# Patient Record
Sex: MALE | Race: ASIAN | NOT HISPANIC OR LATINO | URBAN - METROPOLITAN AREA
[De-identification: names, ages, dates, MRNs, and addresses within clinical notes are randomized per-mention and may not be internally consistent; named-entity substitution may affect disease eponyms.]

---

## 2021-10-04 ENCOUNTER — OFFICE VISIT (OUTPATIENT)
Dept: URBAN - METROPOLITAN AREA CLINIC 33 | Facility: CLINIC | Age: 86
End: 2021-10-04

## 2021-10-04 VITALS
HEIGHT: 64 IN | OXYGEN SATURATION: 98 % | SYSTOLIC BLOOD PRESSURE: 138 MMHG | BODY MASS INDEX: 28 KG/M2 | WEIGHT: 164 LBS | HEART RATE: 73 BPM | DIASTOLIC BLOOD PRESSURE: 80 MMHG

## 2021-10-04 RX ORDER — LIRAGLUTIDE 6 MG/ML
AS DIRECTED INJECTION SUBCUTANEOUS
Status: ACTIVE | COMMUNITY

## 2021-10-04 RX ORDER — REPAGLINIDE 0.5 MG/1
1 TABLET 15 TO 30 MINUTES BEFORE MEALS TABLET ORAL TID
Status: ACTIVE | COMMUNITY

## 2021-10-04 RX ORDER — LEVOTHYROXINE SODIUM 0.05 MG/1
1 TABLET IN THE MORNING ON AN EMPTY STOMACH TABLET ORAL ONCE A DAY
Qty: 30 | Status: ACTIVE | COMMUNITY

## 2021-10-04 RX ORDER — SODIUM, POTASSIUM,MAG SULFATES 17.5-3.13G
ML AS DIRECTED SOLUTION, RECONSTITUTED, ORAL ORAL
Qty: 1 KIT | Refills: 0 | OUTPATIENT
Start: 2021-10-04

## 2021-10-04 RX ORDER — RIVAROXABAN 15 MG/1
1 TABLET WITH FOOD TABLET, FILM COATED ORAL ONCE A DAY
Qty: 30 | Status: ACTIVE | COMMUNITY

## 2021-10-04 RX ORDER — METOPROLOL TARTRATE 25 MG/1
1/2 TABLET TABLET, FILM COATED ORAL TWICE A DAY
Status: ACTIVE | COMMUNITY

## 2021-10-04 RX ORDER — ATORVASTATIN CALCIUM 20 MG/1
1 TABLET TABLET, FILM COATED ORAL ONCE A DAY
Qty: 30 | Status: ACTIVE | COMMUNITY

## 2021-10-04 RX ORDER — ICOSAPENT ETHYL 500 MG/1
4 CAPSULES WITH MEALS CAPSULE ORAL TWICE A DAY
Qty: 240 | Status: ACTIVE | COMMUNITY

## 2021-10-04 NOTE — HPI-MIGRATED HPI
;   ;     Bloating/Gas : 87 y/o male patient admits recent onset of bloating and gas. Patient admits associated symptoms such indigestion and changes in bowel habits.   Patient currently admits 1-2 formed bowel movements per day or every other day. He denies any associated symptoms of mucus, melena, pruritus ani, rectal pain, abdominal cramping/pain, or heartburn.  Patient is having bloating throughout the day, postprandial no matter of the content. There is no improvement with defecation. Patient denies taking any medications for relief of symptoms.   Patient admits last EGD was done over 10 years ago performed by Dr. Samanta Nix, with findings of gastritis.  Patient denies family hx of colonic disease/cancer/polyps or esophageal disease/cancer.  ;   Positive Cologuard : 87 y/o male patient presents today for a consultation about his recent positive Cologuard test. Patient denies visible bleeding in stools. Patient currently admits 1-2 formed bowel movements per day or every other day. He denies any associated symptoms of mucus, melena, pruritus ani, rectal pain, abdominal cramping/pain, or heartburn.  Patient admits last Colonoscopy was done over 10 years ago performed by Dr. Samanta Nix, resulting in polypectomy.    Patient denies a family hx of colon, gastric, or esophageal cancer/polyps.   ;

## 2021-10-11 ENCOUNTER — TELEPHONE ENCOUNTER (OUTPATIENT)
Dept: URBAN - METROPOLITAN AREA CLINIC 35 | Facility: CLINIC | Age: 86
End: 2021-10-11

## 2021-10-19 ENCOUNTER — OFFICE VISIT (OUTPATIENT)
Dept: URBAN - METROPOLITAN AREA SURGERY CENTER 8 | Facility: SURGERY CENTER | Age: 86
End: 2021-10-19

## 2021-11-01 ENCOUNTER — OFFICE VISIT (OUTPATIENT)
Dept: URBAN - METROPOLITAN AREA CLINIC 33 | Facility: CLINIC | Age: 86
End: 2021-11-01

## 2021-11-01 VITALS
WEIGHT: 166 LBS | HEIGHT: 64 IN | BODY MASS INDEX: 28.34 KG/M2 | SYSTOLIC BLOOD PRESSURE: 135 MMHG | DIASTOLIC BLOOD PRESSURE: 78 MMHG | OXYGEN SATURATION: 98 % | HEART RATE: 56 BPM

## 2021-11-01 PROBLEM — 235595009 GASTROESOPHAGEAL REFLUX DISEASE: Status: ACTIVE | Noted: 2021-10-04

## 2021-11-01 RX ORDER — LEVOTHYROXINE SODIUM 0.05 MG/1
1 TABLET IN THE MORNING ON AN EMPTY STOMACH TABLET ORAL ONCE A DAY
Qty: 30 | Status: ACTIVE | COMMUNITY

## 2021-11-01 RX ORDER — REPAGLINIDE 0.5 MG/1
1 TABLET 15 TO 30 MINUTES BEFORE MEALS TABLET ORAL TID
Status: ACTIVE | COMMUNITY

## 2021-11-01 RX ORDER — RIVAROXABAN 15 MG/1
1 TABLET WITH FOOD TABLET, FILM COATED ORAL ONCE A DAY
Qty: 30 | Status: ACTIVE | COMMUNITY

## 2021-11-01 RX ORDER — LIRAGLUTIDE 6 MG/ML
AS DIRECTED INJECTION SUBCUTANEOUS
Status: ACTIVE | COMMUNITY

## 2021-11-01 RX ORDER — ICOSAPENT ETHYL 500 MG/1
4 CAPSULES WITH MEALS CAPSULE ORAL TWICE A DAY
Qty: 240 | Status: ACTIVE | COMMUNITY

## 2021-11-01 RX ORDER — METOPROLOL TARTRATE 25 MG/1
1/2 TABLET TABLET, FILM COATED ORAL TWICE A DAY
Status: ACTIVE | COMMUNITY

## 2021-11-01 RX ORDER — SODIUM, POTASSIUM,MAG SULFATES 17.5-3.13G
ML AS DIRECTED SOLUTION, RECONSTITUTED, ORAL ORAL
Qty: 1 KIT | Refills: 0 | Status: ON HOLD | COMMUNITY
Start: 2021-10-04

## 2021-11-01 RX ORDER — ATORVASTATIN CALCIUM 20 MG/1
1 TABLET TABLET, FILM COATED ORAL ONCE A DAY
Qty: 30 | Status: ACTIVE | COMMUNITY

## 2021-11-01 NOTE — HPI-MIGRATED HPI
;   ;   ;     Bloating/Gas : Patient currently admits intermittent bloating and gas.   Patient also notes that he contiues to experience indigestion.  Patient currently admits 1-2 formed bowel movements per day or every other day. He denies any associated symptoms of mucus, melena, pruritus ani, rectal pain, abdominal cramping/pain, or heartburn.       (Last Visit 10/04/2021) 87 y/o male patient admits recent onset of bloating and gas. Patient admits associated symptoms such indigestion and changes in bowel habits.   Patient currently admits 1-2 formed bowel movements per day or every other day. He denies any associated symptoms of mucus, melena, pruritus ani, rectal pain, abdominal cramping/pain, or heartburn.  Patient is having bloating throughout the day, postprandial no matter of the content. There is no improvement with defecation. Patient denies taking any medications for relief of symptoms.   Patient admits last EGD was done over 10 years ago performed by Dr. Samanta Nix, with findings of gastritis.  Patient denies family hx of colonic disease/cancer/polyps or esophageal disease/cancer.;   Colonoscopy Follow-Up : 87 y/o male patient presents today for follow up to his colonoscopy which was completed on (10/19/2021) by Dr. Atilio Ponce.  Patient denies any complications after his procedure. Patient currently admits 1 formed bowel movements per day.  Patient denies any melena, blood or mucus in stools. Patient denies any associated abdominal pain, heartburn, bloating, or gas.   Colonoscopy report shows: Tubular adenoma ;   Positive Cologuard : Patient denies any complications after his procedure. Patient currently admits 1 formed bowel movements per day.  Patient denies any melena, blood or mucus in stools. Patient denies any associated abdominal pain, heartburn, bloating, or gas.     (Last Visit 10/04/2021) 87 y/o male patient presents today for a consultation about his recent positive Cologuard test. Patient denies visible bleeding in stools. Patient currently admits 1-2 formed bowel movements per day or every other day. He denies any associated symptoms of mucus, melena, pruritus ani, rectal pain, abdominal cramping/pain, or heartburn.  Patient admits last Colonoscopy was done over 10 years ago performed by Dr. Samanta Nix, resulting in polypectomy.    Patient denies a family hx of colon, gastric, or esophageal cancer/polyps.;

## 2023-01-23 ENCOUNTER — TELEPHONE ENCOUNTER (OUTPATIENT)
Dept: URBAN - METROPOLITAN AREA CLINIC 82 | Facility: CLINIC | Age: 88
End: 2023-01-23

## 2023-01-23 ENCOUNTER — LAB OUTSIDE AN ENCOUNTER (OUTPATIENT)
Dept: URBAN - METROPOLITAN AREA CLINIC 111 | Facility: CLINIC | Age: 88
End: 2023-01-23

## 2023-01-23 ENCOUNTER — OFFICE VISIT (OUTPATIENT)
Dept: URBAN - METROPOLITAN AREA CLINIC 111 | Facility: CLINIC | Age: 88
End: 2023-01-23
Payer: COMMERCIAL

## 2023-01-23 VITALS
HEART RATE: 80 BPM | DIASTOLIC BLOOD PRESSURE: 71 MMHG | SYSTOLIC BLOOD PRESSURE: 128 MMHG | BODY MASS INDEX: 27.52 KG/M2 | HEIGHT: 64 IN | WEIGHT: 161.2 LBS

## 2023-01-23 DIAGNOSIS — R10.13 EPIGASTRIC ABDOMINAL PAIN: ICD-10-CM

## 2023-01-23 PROCEDURE — 99213 OFFICE O/P EST LOW 20 MIN: CPT | Performed by: INTERNAL MEDICINE

## 2023-01-23 RX ORDER — SODIUM, POTASSIUM,MAG SULFATES 17.5-3.13G
ML AS DIRECTED SOLUTION, RECONSTITUTED, ORAL ORAL
Qty: 1 KIT | Refills: 0 | Status: ON HOLD | COMMUNITY
Start: 2021-10-04

## 2023-01-23 RX ORDER — LEVOTHYROXINE SODIUM 0.05 MG/1
1 TABLET IN THE MORNING ON AN EMPTY STOMACH TABLET ORAL ONCE A DAY
Qty: 30 | Status: ACTIVE | COMMUNITY

## 2023-01-23 RX ORDER — ATORVASTATIN CALCIUM 20 MG/1
1 TABLET TABLET, FILM COATED ORAL ONCE A DAY
Qty: 30 | Status: ACTIVE | COMMUNITY

## 2023-01-23 RX ORDER — METOPROLOL TARTRATE 25 MG/1
1/2 TABLET TABLET, FILM COATED ORAL TWICE A DAY
Status: ACTIVE | COMMUNITY

## 2023-01-23 RX ORDER — ATORVASTATIN CALCIUM 40 MG/1
1 TABLET TABLET, FILM COATED ORAL ONCE A DAY
Status: ACTIVE | COMMUNITY

## 2023-01-23 RX ORDER — ICOSAPENT ETHYL 500 MG/1
4 CAPSULES WITH MEALS CAPSULE ORAL TWICE A DAY
Qty: 240 | Status: ACTIVE | COMMUNITY

## 2023-01-23 RX ORDER — LIRAGLUTIDE 6 MG/ML
AS DIRECTED INJECTION SUBCUTANEOUS
Status: ACTIVE | COMMUNITY

## 2023-01-23 RX ORDER — RIVAROXABAN 15 MG/1
1 TABLET WITH FOOD TABLET, FILM COATED ORAL ONCE A DAY
Qty: 30 | Status: ACTIVE | COMMUNITY

## 2023-01-23 RX ORDER — REPAGLINIDE 0.5 MG/1
1 TABLET 15 TO 30 MINUTES BEFORE MEALS TABLET ORAL TID
Status: ACTIVE | COMMUNITY

## 2023-01-23 NOTE — HPI-EPIGASTRIC PAIN
Patient presents with abdominal pain.  Patient states pain is the epigastric area.  Patient states pain is dull ache.  Patient denies radiation of the pain.  Patient states association with eating.  No specific triggers.  Patient denies NSAIDs.  Patient denies nausea or vomiting.  Patient denies any fever or chills.  Patient denies any diarrhea or constipation.  Patient denies any melena or rectal bleeding.

## 2023-02-02 ENCOUNTER — TELEPHONE ENCOUNTER (OUTPATIENT)
Dept: URBAN - METROPOLITAN AREA CLINIC 12 | Facility: CLINIC | Age: 88
End: 2023-02-02

## 2023-03-01 ENCOUNTER — OFFICE VISIT (OUTPATIENT)
Dept: URBAN - METROPOLITAN AREA CLINIC 23 | Facility: CLINIC | Age: 88
End: 2023-03-01

## 2023-03-14 ENCOUNTER — OFFICE VISIT (OUTPATIENT)
Dept: URBAN - METROPOLITAN AREA MEDICAL CENTER 27 | Facility: MEDICAL CENTER | Age: 88
End: 2023-03-14

## 2023-03-14 ENCOUNTER — CLAIMS CREATED FROM THE CLAIM WINDOW (OUTPATIENT)
Dept: URBAN - METROPOLITAN AREA MEDICAL CENTER 27 | Facility: MEDICAL CENTER | Age: 88
End: 2023-03-14
Payer: COMMERCIAL

## 2023-03-14 DIAGNOSIS — K22.2 ACQUIRED ESOPHAGEAL RING: ICD-10-CM

## 2023-03-14 DIAGNOSIS — K22.89 DILATATION OF ESOPHAGUS: ICD-10-CM

## 2023-03-14 DIAGNOSIS — K31.89 ACQUIRED DEFORMITY OF DUODENUM: ICD-10-CM

## 2023-03-14 DIAGNOSIS — R13.19 CERVICAL DYSPHAGIA: ICD-10-CM

## 2023-03-14 PROCEDURE — 43248 EGD GUIDE WIRE INSERTION: CPT | Performed by: INTERNAL MEDICINE

## 2023-03-14 PROCEDURE — 43239 EGD BIOPSY SINGLE/MULTIPLE: CPT | Performed by: INTERNAL MEDICINE

## 2023-04-19 ENCOUNTER — OFFICE VISIT (OUTPATIENT)
Dept: URBAN - METROPOLITAN AREA CLINIC 23 | Facility: CLINIC | Age: 88
End: 2023-04-19
Payer: COMMERCIAL

## 2023-04-19 VITALS
SYSTOLIC BLOOD PRESSURE: 134 MMHG | DIASTOLIC BLOOD PRESSURE: 69 MMHG | HEIGHT: 64 IN | TEMPERATURE: 97.1 F | HEART RATE: 74 BPM | BODY MASS INDEX: 20.96 KG/M2 | WEIGHT: 122.8 LBS

## 2023-04-19 DIAGNOSIS — K22.70 BARRETT'S ESOPHAGUS WITHOUT DYSPLASIA: ICD-10-CM

## 2023-04-19 PROBLEM — 302914006: Status: ACTIVE | Noted: 2023-04-19

## 2023-04-19 PROCEDURE — 99213 OFFICE O/P EST LOW 20 MIN: CPT | Performed by: INTERNAL MEDICINE

## 2023-04-19 RX ORDER — ATORVASTATIN CALCIUM 20 MG/1
1 TABLET TABLET, FILM COATED ORAL ONCE A DAY
Qty: 30 | Status: ACTIVE | COMMUNITY

## 2023-04-19 RX ORDER — REPAGLINIDE 0.5 MG/1
1 TABLET 15 TO 30 MINUTES BEFORE MEALS TABLET ORAL TID
Status: ACTIVE | COMMUNITY

## 2023-04-19 RX ORDER — ASPIRIN 81 MG/1
1 TABLET TABLET, CHEWABLE ORAL ONCE A DAY
Status: ACTIVE | COMMUNITY

## 2023-04-19 RX ORDER — LEVOTHYROXINE SODIUM 0.05 MG/1
1 TABLET IN THE MORNING ON AN EMPTY STOMACH TABLET ORAL ONCE A DAY
Qty: 30 | Status: ACTIVE | COMMUNITY

## 2023-04-19 RX ORDER — CIMETIDINE 400 MG/1
1 TABLET AT BEDTIME TABLET, FILM COATED ORAL ONCE A DAY
Qty: 30 | Refills: 11 | OUTPATIENT
Start: 2023-04-19

## 2023-04-19 RX ORDER — METOPROLOL TARTRATE 25 MG/1
1/2 TABLET TABLET, FILM COATED ORAL TWICE A DAY
Status: ACTIVE | COMMUNITY

## 2023-04-19 RX ORDER — RIVAROXABAN 15 MG/1
1 TABLET WITH FOOD TABLET, FILM COATED ORAL ONCE A DAY
Qty: 30 | Status: ACTIVE | COMMUNITY

## 2023-04-19 RX ORDER — LIRAGLUTIDE 6 MG/ML
AS DIRECTED INJECTION SUBCUTANEOUS
Status: ON HOLD | COMMUNITY

## 2023-04-19 RX ORDER — ICOSAPENT ETHYL 500 MG/1
4 CAPSULES WITH MEALS CAPSULE ORAL TWICE A DAY
Qty: 240 | Status: ACTIVE | COMMUNITY

## 2023-04-19 RX ORDER — SODIUM, POTASSIUM,MAG SULFATES 17.5-3.13G
ML AS DIRECTED SOLUTION, RECONSTITUTED, ORAL ORAL
Qty: 1 KIT | Refills: 0 | Status: ON HOLD | COMMUNITY
Start: 2021-10-04

## 2023-04-19 RX ORDER — ATORVASTATIN CALCIUM 40 MG/1
1 TABLET TABLET, FILM COATED ORAL ONCE A DAY
Status: ACTIVE | COMMUNITY

## 2023-04-19 NOTE — HPI-ENDOSCOPY (EGD) FOLLOWUP
Patient is here for follow-up after EGD.  Patient had biopsies.  Patient denies any complications.  Patient denies any abdominal pain.  Patient denies any nausea or vomiting.  Patient denies any diarrhea or constipation.  Patient denies any melena or rectal bleeding.

## 2024-03-07 ENCOUNTER — OV NP (OUTPATIENT)
Dept: URBAN - METROPOLITAN AREA CLINIC 23 | Facility: CLINIC | Age: 89
End: 2024-03-07

## 2024-03-07 ENCOUNTER — OV EP (OUTPATIENT)
Dept: URBAN - METROPOLITAN AREA CLINIC 23 | Facility: CLINIC | Age: 89
End: 2024-03-07
Payer: COMMERCIAL

## 2024-03-07 ENCOUNTER — LAB (OUTPATIENT)
Dept: URBAN - METROPOLITAN AREA CLINIC 23 | Facility: CLINIC | Age: 89
End: 2024-03-07

## 2024-03-07 VITALS
DIASTOLIC BLOOD PRESSURE: 73 MMHG | TEMPERATURE: 98.2 F | WEIGHT: 168 LBS | SYSTOLIC BLOOD PRESSURE: 144 MMHG | HEIGHT: 64 IN | BODY MASS INDEX: 28.68 KG/M2 | HEART RATE: 75 BPM

## 2024-03-07 DIAGNOSIS — R19.7 ACUTE DIARRHEA: ICD-10-CM

## 2024-03-07 DIAGNOSIS — R10.13 EPIGASTRIC ABDOMINAL PAIN: ICD-10-CM

## 2024-03-07 DIAGNOSIS — Z87.19 HISTORY OF ESOPHAGEAL STRICTURE: ICD-10-CM

## 2024-03-07 PROCEDURE — 99213 OFFICE O/P EST LOW 20 MIN: CPT | Performed by: NURSE PRACTITIONER

## 2024-03-07 RX ORDER — ATORVASTATIN CALCIUM 20 MG/1
1 TABLET TABLET, FILM COATED ORAL ONCE A DAY
Qty: 30 | Status: ACTIVE | COMMUNITY

## 2024-03-07 RX ORDER — ATORVASTATIN CALCIUM 40 MG/1
1 TABLET TABLET, FILM COATED ORAL ONCE A DAY
Status: ACTIVE | COMMUNITY

## 2024-03-07 RX ORDER — ICOSAPENT ETHYL 500 MG/1
4 CAPSULES WITH MEALS CAPSULE ORAL TWICE A DAY
Qty: 240 | Status: ACTIVE | COMMUNITY

## 2024-03-07 RX ORDER — ASPIRIN 81 MG/1
1 TABLET TABLET, CHEWABLE ORAL ONCE A DAY
Status: ACTIVE | COMMUNITY

## 2024-03-07 RX ORDER — CIMETIDINE 400 MG/1
1 TABLET AT BEDTIME TABLET, FILM COATED ORAL ONCE A DAY
Qty: 30 | Refills: 11 | Status: ACTIVE | COMMUNITY
Start: 2023-04-19

## 2024-03-07 RX ORDER — RIVAROXABAN 15 MG/1
1 TABLET WITH FOOD TABLET, FILM COATED ORAL ONCE A DAY
Qty: 30 | Status: ACTIVE | COMMUNITY

## 2024-03-07 RX ORDER — METOPROLOL TARTRATE 25 MG/1
1/2 TABLET TABLET, FILM COATED ORAL TWICE A DAY
Status: ACTIVE | COMMUNITY

## 2024-03-07 RX ORDER — LEVOTHYROXINE SODIUM 0.05 MG/1
1 TABLET IN THE MORNING ON AN EMPTY STOMACH TABLET ORAL ONCE A DAY
Qty: 30 | Status: ACTIVE | COMMUNITY

## 2024-03-07 RX ORDER — REPAGLINIDE 0.5 MG/1
1 TABLET 15 TO 30 MINUTES BEFORE MEALS TABLET ORAL TID
Status: ACTIVE | COMMUNITY

## 2024-03-07 NOTE — HPI-TODAY'S VISIT:
Mr. Melo is a 88 year old male with a history of GERD, DM2, hyperlipidemia, hypertension, and hypothyroidism who came in for new onset of foul smelling diarrhea.   Tightness and pain in epigastric area for the last 3-4 months. Feels fullness with or without eating. Decreased appetite. No nausea and vomiting. No weight changes. 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 EGD 3/3023 with esophageal stenosis s/p dilatation, LA grade A esophagitis and gastritis.   New onset of bad smelling diarrhea and incontinece, 3-5 times a day, 1-2 during the night. No lower abdominal pain or rectal pain. Associated cramping. No black or bloody stools. Varing consistencies. Last colonoscopy 2021 with diverticulosis, polyps  Aricept within the last 3 months. No antibiotics.

## 2024-03-12 ENCOUNTER — OV EP (OUTPATIENT)
Dept: URBAN - METROPOLITAN AREA CLINIC 23 | Facility: CLINIC | Age: 89
End: 2024-03-12

## 2024-05-13 ENCOUNTER — WEB ENCOUNTER (OUTPATIENT)
Dept: URBAN - METROPOLITAN AREA CLINIC 78 | Facility: CLINIC | Age: 89
End: 2024-05-13

## 2024-05-13 RX ORDER — OMEPRAZOLE 40 MG/1
1 CAPSULE 30 MINUTES BEFORE MORNING MEAL CAPSULE, DELAYED RELEASE ORAL ONCE A DAY
Qty: 90 | Refills: 3

## 2024-07-24 ENCOUNTER — OFFICE VISIT (OUTPATIENT)
Dept: URBAN - METROPOLITAN AREA CLINIC 23 | Facility: CLINIC | Age: 89
End: 2024-07-24